# Patient Record
Sex: FEMALE | Race: OTHER | Employment: PART TIME | URBAN - METROPOLITAN AREA
[De-identification: names, ages, dates, MRNs, and addresses within clinical notes are randomized per-mention and may not be internally consistent; named-entity substitution may affect disease eponyms.]

---

## 2024-08-18 ENCOUNTER — HOSPITAL ENCOUNTER (OUTPATIENT)
Age: 24
Discharge: HOME | End: 2024-08-18
Payer: MEDICAID

## 2024-08-18 ENCOUNTER — APPOINTMENT (OUTPATIENT)
Dept: CT IMAGING | Age: 24
End: 2024-08-18
Attending: NURSE PRACTITIONER
Payer: MEDICAID

## 2024-08-18 VITALS
WEIGHT: 176 LBS | HEIGHT: 63 IN | SYSTOLIC BLOOD PRESSURE: 120 MMHG | TEMPERATURE: 97 F | DIASTOLIC BLOOD PRESSURE: 78 MMHG | BODY MASS INDEX: 31.18 KG/M2 | OXYGEN SATURATION: 95 % | RESPIRATION RATE: 16 BRPM | HEART RATE: 89 BPM

## 2024-08-18 DIAGNOSIS — K52.9 GASTROENTERITIS: Primary | ICD-10-CM

## 2024-08-18 DIAGNOSIS — E86.0 DEHYDRATION: ICD-10-CM

## 2024-08-18 LAB
#MXD IC: 0.3 X10ˆ3/UL (ref 0.1–1)
B-HCG UR QL: NEGATIVE
BUN BLD-MCNC: 10 MG/DL (ref 7–18)
CHLORIDE BLD-SCNC: 103 MMOL/L (ref 98–112)
CLARITY UR: CLEAR
CO2 BLD-SCNC: 22 MMOL/L (ref 21–32)
COLOR UR: YELLOW
CREAT BLD-MCNC: 0.6 MG/DL
EGFRCR SERPLBLD CKD-EPI 2021: 129 ML/MIN/1.73M2 (ref 60–?)
GLUCOSE BLD-MCNC: 114 MG/DL (ref 70–99)
GLUCOSE UR STRIP-MCNC: NEGATIVE MG/DL
HCT VFR BLD AUTO: 41.5 %
HCT VFR BLD CALC: 44 %
HGB BLD-MCNC: 13.5 G/DL
HGB UR QL STRIP: NEGATIVE
ISTAT IONIZED CALCIUM FOR CHEM 8: 1.18 MMOL/L (ref 1.12–1.32)
KETONES UR STRIP-MCNC: >=160 MG/DL
LEUKOCYTE ESTERASE UR QL STRIP: NEGATIVE
LYMPHOCYTES # BLD AUTO: 1.2 X10ˆ3/UL (ref 1–4)
LYMPHOCYTES NFR BLD AUTO: 9.9 %
MCH RBC QN AUTO: 28.1 PG (ref 26–34)
MCHC RBC AUTO-ENTMCNC: 32.5 G/DL (ref 31–37)
MCV RBC AUTO: 86.5 FL (ref 80–100)
MIXED CELL %: 2.4 %
NEUTROPHILS # BLD AUTO: 10.5 X10ˆ3/UL (ref 1.5–7.7)
NEUTROPHILS NFR BLD AUTO: 87.7 %
NITRITE UR QL STRIP: NEGATIVE
PH UR STRIP: 7 [PH]
PLATELET # BLD AUTO: 314 X10ˆ3/UL (ref 150–450)
POTASSIUM BLD-SCNC: 3.2 MMOL/L (ref 3.6–5.1)
PROT UR STRIP-MCNC: 30 MG/DL
RBC # BLD AUTO: 4.8 X10ˆ6/UL
SODIUM BLD-SCNC: 139 MMOL/L (ref 136–145)
SP GR UR STRIP: 1.02
UROBILINOGEN UR STRIP-ACNC: <2 MG/DL
WBC # BLD AUTO: 12 X10ˆ3/UL (ref 4–11)

## 2024-08-18 PROCEDURE — 81025 URINE PREGNANCY TEST: CPT

## 2024-08-18 PROCEDURE — 81002 URINALYSIS NONAUTO W/O SCOPE: CPT

## 2024-08-18 PROCEDURE — 99205 OFFICE O/P NEW HI 60 MIN: CPT

## 2024-08-18 PROCEDURE — 85025 COMPLETE CBC W/AUTO DIFF WBC: CPT | Performed by: NURSE PRACTITIONER

## 2024-08-18 PROCEDURE — 74177 CT ABD & PELVIS W/CONTRAST: CPT | Performed by: NURSE PRACTITIONER

## 2024-08-18 PROCEDURE — 96361 HYDRATE IV INFUSION ADD-ON: CPT

## 2024-08-18 PROCEDURE — 96374 THER/PROPH/DIAG INJ IV PUSH: CPT

## 2024-08-18 PROCEDURE — 96375 TX/PRO/DX INJ NEW DRUG ADDON: CPT

## 2024-08-18 PROCEDURE — 99204 OFFICE O/P NEW MOD 45 MIN: CPT

## 2024-08-18 PROCEDURE — 80047 BASIC METABLC PNL IONIZED CA: CPT

## 2024-08-18 RX ORDER — SODIUM CHLORIDE 9 MG/ML
1000 INJECTION, SOLUTION INTRAVENOUS ONCE
Status: COMPLETED | OUTPATIENT
Start: 2024-08-18 | End: 2024-08-18

## 2024-08-18 RX ORDER — KETOROLAC TROMETHAMINE 30 MG/ML
30 INJECTION, SOLUTION INTRAMUSCULAR; INTRAVENOUS ONCE
Status: COMPLETED | OUTPATIENT
Start: 2024-08-18 | End: 2024-08-18

## 2024-08-18 RX ORDER — ETONOGESTREL 68 MG/1
IMPLANT SUBCUTANEOUS
COMMUNITY

## 2024-08-18 RX ORDER — ONDANSETRON 4 MG/1
4 TABLET, ORALLY DISINTEGRATING ORAL EVERY 4 HOURS PRN
Qty: 30 TABLET | Refills: 0 | Status: SHIPPED | OUTPATIENT
Start: 2024-08-18 | End: 2024-08-25

## 2024-08-18 RX ORDER — DICYCLOMINE HCL 20 MG
20 TABLET ORAL 4 TIMES DAILY PRN
Qty: 30 TABLET | Refills: 0 | Status: SHIPPED | OUTPATIENT
Start: 2024-08-18 | End: 2024-09-17

## 2024-08-18 RX ORDER — ONDANSETRON 2 MG/ML
4 INJECTION INTRAMUSCULAR; INTRAVENOUS ONCE
Status: COMPLETED | OUTPATIENT
Start: 2024-08-18 | End: 2024-08-18

## 2024-08-18 NOTE — ED PROVIDER NOTES
Patient Seen in: [facility]      History     Chief Complaint   Patient presents with    Abdomen/Flank Pain    Nausea/Vomiting/Diarrhea     Stated Complaint: sob, chest feels tight, vomiting x 2 days    Subjective:   24-year-old female presents to immediate care for vomiting diarrhea.  Patient states symptoms started on Friday.  She reports she has been unable to tolerate p.o. fluids or food          Objective:   History reviewed. No pertinent past medical history.           History reviewed. No pertinent surgical history.             Social History     Socioeconomic History    Marital status: Single   Tobacco Use    Smoking status: Never    Smokeless tobacco: Never   Vaping Use    Vaping status: Every Day   Substance and Sexual Activity    Alcohol use: Yes     Comment: once per week    Drug use: Not Currently     Social Determinants of Health     Financial Resource Strain: Not at Risk (2024)    Received from [facility]    Financial Resource Strain     Financial Resource Strain: 1   Food Insecurity: Not at Risk (2024)    Received from [facility] [facility]    Food Insecurity     Food: 1   Transportation Needs: Not at Risk (2024)    Received from [facility] [facility]    Transportation Needs     Transportation: 1   Physical Activity: Not on File (2024)    Received from [facility] [facility]    Physical Activity     Physical Activity: 0   Stress: Not on File (2024)    Received from [facility] [facility]    Stress     Stress: 0   Social Connections: Not on File (2024)    Received from [facility] [facility]    Social Connections     Social Connections and Isolation: 0   Housing Stability: Not at Risk (2024)    Received from [employer] [facility]    Housing Stability     Housin              Review of Systems   Constitutional: Negative.    Respiratory: Negative.     Cardiovascular: Negative.    Gastrointestinal:  Positive for abdominal pain, diarrhea, nausea and vomiting.   Skin: Negative.    Neurological: Negative.        Positive for stated Chief Complaint:  Abdomen/Flank Pain and Nausea/Vomiting/Diarrhea    Other systems are as noted in HPI.  Constitutional and vital signs reviewed.      All other systems reviewed and negative except as noted above.    Physical Exam     ED Triage Vitals [08/18/24 0916]   /90   Pulse 90   Resp 20   Temp 97.1 °F (36.2 °C)   Temp src Temporal   SpO2 98 %   O2 Device None (Room air)       Current Vitals:   Vital Signs  BP: 129/90  Pulse: 90  Resp: 20  Temp: 97.1 °F (36.2 °C)  Temp src: Temporal    Oxygen Therapy  SpO2: 98 %  O2 Device: None (Room air)            Physical Exam  Vitals and nursing note reviewed.   Constitutional:       General: She is not in acute distress.  HENT:      Head: Normocephalic.   Cardiovascular:      Rate and Rhythm: Normal rate.   Pulmonary:      Effort: Pulmonary effort is normal.   Abdominal:      General: Bowel sounds are normal.      Palpations: Abdomen is soft.      Tenderness: There is generalized abdominal tenderness.   Musculoskeletal:         General: Normal range of motion.   Skin:     General: Skin is warm and dry.   Neurological:      General: No focal deficit present.      Mental Status: She is alert and oriented to person, place, and time.               ED Course     Labs Reviewed   POCT CBC - Abnormal; Notable for the following components:       Result Value    WBC IC 12.0 (*)     # Neutrophil 10.5 (*)     All other components within normal limits   POCT ISTAT CHEM8 CARTRIDGE - Abnormal; Notable for the following components:    ISTAT Potassium 3.2 (*)     ISTAT Glucose 114 (*)     All other components within normal limits   [facility] POCT URINALYSIS DIPSTICK - Abnormal; Notable for the following components:    Protein urine 30 (*)     Ketone, Urine >=160 (*)     Bilirubin, Urine Small (*)     All other components within normal limits   POCT PREGNANCY URINE - Normal     CT ABDOMEN+PELVIS(CONTRAST ONLY)(CPT=74177)    Result Date: 8/18/2024  PROCEDURE:  CT ABDOMEN+PELVIS (CONTRAST ONLY) (CPT=74177)   COMPARISON:  None.  INDICATIONS:  Right lower quadrant tender  TECHNIQUE:  CT scanning was performed from the dome of the diaphragm to the pubic symphysis with non-ionic intravenous contrast material. Post contrast coronal MPR imaging was performed.  Dose reduction techniques were used. Dose information is transmitted to the ACR (American College of Radiology) NRDR (National Radiology Data Registry) which includes the Dose Index Registry.  PATIENT STATED HISTORY:(As transcribed by Technologist)  The patient complaints of nausea, vomiting and right lower quadrant abdominal pain.    CONTRAST USED:  85cc of Isovue 370  FINDINGS: LUNG BASE:  Unremarkable. LIVER:  Unremarkable. BILIARY:  Unremarkable. SPLEEN:  Unremarkable. PANCREAS:  Unremarkable. ADRENALS:  Unremarkable. KIDNEYS:  2.9 cm simple appearing cyst in the mid left kidney. AORTA/VASCULAR:  Unremarkable. RETROPERITONEUM:  Unremarkable. BOWEL/MESENTERY:  Unremarkable appendix.  Scattered feces in the colon.  Mild colonic wall thickening may be related to incomplete distension, with nonspecific colitis not excluded.  Clinical correlation recommended. ABDOMINAL WALL:  Unremarkable. PELVIC ORGANS:  Unremarkable uterus and ovaries.  Tampon in the vagina.  Decompressed urinary bladder. LYMPH NODES:  No lymphadenopathy in the abdomen or pelvis. BONES:  Unremarkable. OTHER:  None.            CONCLUSION:   1. Mild colonic wall thickening may be related to incomplete distension, with nonspecific colitis not excluded.  Clinical correlation recommended.  2. No CT evidence of acute appendicitis.  Please see above for further details.  LOCATION:  [facility]   Dictated by (CST): Randy Boss MD on 8/18/2024 at 11:12 AM     Finalized by (CST): Randy Boss MD on 8/18/2024 at 11:15 AM                         MDM      Medical Decision Making  Pertinent Labs & Imaging studies reviewed. (See chart for details).  Patient coming in with vomiting, diarrhea.   Differential  diagnosis includes gastroenteritis, appendicitis, bowel obstruction  Labs reviewed mild dehydration noted. Radiology CT does not reveal any acute surgical findings..  Will treat for gastroenteritis, dehydration.  Will discharge on patient improved after Zofran, 1 L fluids given.  Will discharge on Bentyl, Zofran. Patient is comfortable with this plan.     Overall Pt looks good. Non-toxic, well-hydrated and in no respiratory distress. Vital signs are reassuring. Exam is reassuring. I do not believe pt requires and additional diagnostic studies or intervention. I believe pt can be discharged home to continue evaluation as an outpatient. Follow-up provider given. Discharge instructions given and reviewed. Return for any problems. All understand and agrees with the plan.        Problems Addressed:  Gastroenteritis: acute illness or injury        Disposition and Plan     Clinical Impression:  1. Gastroenteritis    2. Dehydration         Disposition:  Discharge  8/18/2024 11:30 am    Follow-up:  Minh Watson MD  [address] [address]  [phone]                Medications Prescribed:  Current Discharge Medication List        START taking these medications    Details   ondansetron 4 MG Oral Tablet Dispersible Take 1 tablet (4 mg total) by mouth every 4 (four) hours as needed for Nausea.  Qty: 30 tablet, Refills: 0      dicyclomine 20 MG Oral Tab Take 1 tablet (20 mg total) by mouth 4 (four) times daily as needed.  Qty: 30 tablet, Refills: 0

## 2024-08-18 NOTE — ED INITIAL ASSESSMENT (HPI)
Pt. States that she has been having nausea and vomiting since Friday morning. Is unable to keep anything down. Also has had a few episodes of diarrhea. C/o stomach pain. States that due to the stomach pain she feels like she can't take a deep breath.

## (undated) NOTE — LETTER
Date & Time: 8/18/2024, 11:30 AM  Patient: Harvinder Coreas  Encounter Provider(s):    Dee López APRN       To Whom It May Concern:    Harvinder Coreas was seen and treated in our department on 8/18/2024. She should not return to work until 08/21/2024 .    If you have any questions or concerns, please do not hesitate to call.        Dee López NP-C  Nurse Practitioner    This note has been electronically signed